# Patient Record
Sex: FEMALE | Race: WHITE | NOT HISPANIC OR LATINO | Employment: STUDENT | URBAN - METROPOLITAN AREA
[De-identification: names, ages, dates, MRNs, and addresses within clinical notes are randomized per-mention and may not be internally consistent; named-entity substitution may affect disease eponyms.]

---
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
## 2022-09-26 ENCOUNTER — OFFICE VISIT (OUTPATIENT)
Dept: URGENT CARE | Facility: CLINIC | Age: 22
End: 2022-09-26
Payer: COMMERCIAL

## 2022-09-26 VITALS
DIASTOLIC BLOOD PRESSURE: 58 MMHG | WEIGHT: 135 LBS | RESPIRATION RATE: 16 BRPM | HEART RATE: 61 BPM | HEIGHT: 70 IN | OXYGEN SATURATION: 99 % | BODY MASS INDEX: 19.33 KG/M2 | TEMPERATURE: 98 F | SYSTOLIC BLOOD PRESSURE: 98 MMHG

## 2022-09-26 DIAGNOSIS — R09.82 PND (POST-NASAL DRIP): ICD-10-CM

## 2022-09-26 DIAGNOSIS — R05.9 COUGH: ICD-10-CM

## 2022-09-26 DIAGNOSIS — J06.9 UPPER RESPIRATORY TRACT INFECTION, UNSPECIFIED TYPE: Primary | ICD-10-CM

## 2022-09-26 PROCEDURE — 99213 PR OFFICE/OUTPT VISIT, EST, LEVL III, 20-29 MIN: ICD-10-PCS | Mod: S$GLB,,, | Performed by: PHYSICIAN ASSISTANT

## 2022-09-26 PROCEDURE — 3074F SYST BP LT 130 MM HG: CPT | Mod: CPTII,S$GLB,, | Performed by: PHYSICIAN ASSISTANT

## 2022-09-26 PROCEDURE — 3074F PR MOST RECENT SYSTOLIC BLOOD PRESSURE < 130 MM HG: ICD-10-PCS | Mod: CPTII,S$GLB,, | Performed by: PHYSICIAN ASSISTANT

## 2022-09-26 PROCEDURE — 3078F PR MOST RECENT DIASTOLIC BLOOD PRESSURE < 80 MM HG: ICD-10-PCS | Mod: CPTII,S$GLB,, | Performed by: PHYSICIAN ASSISTANT

## 2022-09-26 PROCEDURE — 1159F PR MEDICATION LIST DOCUMENTED IN MEDICAL RECORD: ICD-10-PCS | Mod: CPTII,S$GLB,, | Performed by: PHYSICIAN ASSISTANT

## 2022-09-26 PROCEDURE — 3008F PR BODY MASS INDEX (BMI) DOCUMENTED: ICD-10-PCS | Mod: CPTII,S$GLB,, | Performed by: PHYSICIAN ASSISTANT

## 2022-09-26 PROCEDURE — 1160F PR REVIEW ALL MEDS BY PRESCRIBER/CLIN PHARMACIST DOCUMENTED: ICD-10-PCS | Mod: CPTII,S$GLB,, | Performed by: PHYSICIAN ASSISTANT

## 2022-09-26 PROCEDURE — 1160F RVW MEDS BY RX/DR IN RCRD: CPT | Mod: CPTII,S$GLB,, | Performed by: PHYSICIAN ASSISTANT

## 2022-09-26 PROCEDURE — 99213 OFFICE O/P EST LOW 20 MIN: CPT | Mod: S$GLB,,, | Performed by: PHYSICIAN ASSISTANT

## 2022-09-26 PROCEDURE — 1159F MED LIST DOCD IN RCRD: CPT | Mod: CPTII,S$GLB,, | Performed by: PHYSICIAN ASSISTANT

## 2022-09-26 PROCEDURE — 3078F DIAST BP <80 MM HG: CPT | Mod: CPTII,S$GLB,, | Performed by: PHYSICIAN ASSISTANT

## 2022-09-26 PROCEDURE — 3008F BODY MASS INDEX DOCD: CPT | Mod: CPTII,S$GLB,, | Performed by: PHYSICIAN ASSISTANT

## 2022-09-26 RX ORDER — BENZONATATE 100 MG/1
200 CAPSULE ORAL 3 TIMES DAILY PRN
Qty: 30 CAPSULE | Refills: 0 | Status: SHIPPED | OUTPATIENT
Start: 2022-09-26 | End: 2023-09-03

## 2022-09-26 RX ORDER — PREDNISONE 20 MG/1
TABLET ORAL
Qty: 7 TABLET | Refills: 0 | Status: SHIPPED | OUTPATIENT
Start: 2022-09-26 | End: 2023-09-03

## 2022-09-26 NOTE — LETTER
September 26, 2022      [facility]  [address] [place] [address] JERALD GALAN E  JIGNESH CHANDLER [address]  Phone: [phone]  Fax: [phone]       Patient: Dara Garcia   YOB: 2000  Date of Visit: 09/26/2022    To Whom It May Concern:    Laith Garcia  was at [facility] on 09/26/2022. The patient may return to work/school on 9/27/22 with no restrictions. If you have any questions or concerns, or if I can be of further assistance, please do not hesitate to contact me.    Sincerely,    Aretha Farias PA-C

## 2022-09-26 NOTE — PROGRESS NOTES
"Subjective:       Patient ID: Dara Garcia is a 22 y.o. female.    Vitals:  height is 5' 10" (1.778 m) and weight is 61.2 kg (135 lb). Her oral temperature is 97.8 °F (36.6 °C). Her blood pressure is 98/58 (abnormal) and her pulse is 61. Her respiration is 16 and oxygen saturation is 99%.     Chief Complaint: Cough    Pt c/o sx of sore throat, postnasal drip for 2 weeks, and a cough that has been going on for about 1 week.   She has been taking Dayquil.    Cough  This is a new problem. The current episode started 1 to 4 weeks ago. The problem has been unchanged. The cough is Non-productive. Associated symptoms include nasal congestion and postnasal drip. Pertinent negatives include no chills, ear pain, fever, headaches, myalgias, rhinorrhea, sore throat, shortness of breath or wheezing. She has tried OTC cough suppressant for the symptoms. The treatment provided no relief.     Constitution: Negative for chills and fever.   HENT:  Positive for postnasal drip. Negative for ear pain and sore throat.    Cardiovascular: Negative.    Respiratory:  Positive for cough. Negative for shortness of breath and wheezing.    Gastrointestinal: Negative.    Musculoskeletal:  Negative for muscle ache.   Skin: Negative.    Neurological:  Negative for headaches.     Objective:      Physical Exam   Constitutional: She appears well-developed.  Non-toxic appearance. She does not appear ill. No distress.   HENT:   Head: Normocephalic and atraumatic.   Ears:   Right Ear: Tympanic membrane, external ear and ear canal normal.   Left Ear: Tympanic membrane, external ear and ear canal normal.   Nose: Congestion present.   Mouth/Throat: Mucous membranes are moist. Posterior oropharyngeal erythema (+ PND) present. No oropharyngeal exudate.   Eyes: Conjunctivae and EOM are normal.   Neck: Neck supple.   Pulmonary/Chest: Effort normal and breath sounds normal.   Abdominal: Normal appearance.   Musculoskeletal: Normal range of motion.   "       General: Normal range of motion.   Lymphadenopathy:     She has no cervical adenopathy.   Neurological: no focal deficit. She is alert. She displays no weakness. Gait normal.   Skin: Skin is warm, dry, not diaphoretic, not pale and no rash.   Psychiatric: Her behavior is normal.       Assessment:       1. Upper respiratory tract infection, unspecified type    2. PND (post-nasal drip)    3. Cough          Plan:       Symptoms ongoing for about 2 weeks, afebrile. Low concern for covid or flu at this time and pt does not wish to be tested.  Continue DayQuil as needed.  Tessalon Perles as needed for cough.  Will start on prednisone taper and see if any improvement.  Also discussed beginning daily antihistamine and/or Flonase.  Recommend close follow-up with PCP if symptoms worsen or fail to improve.  Upper respiratory tract infection, unspecified type  -     predniSONE (DELTASONE) 20 MG tablet; Take 1 tablet twice daily for 2 days. Then take 1 tablet daily for 3 days.  Dispense: 7 tablet; Refill: 0  -     benzonatate (TESSALON PERLES) 100 MG capsule; Take 2 capsules (200 mg total) by mouth 3 (three) times daily as needed for Cough.  Dispense: 30 capsule; Refill: 0    PND (post-nasal drip)  -     predniSONE (DELTASONE) 20 MG tablet; Take 1 tablet twice daily for 2 days. Then take 1 tablet daily for 3 days.  Dispense: 7 tablet; Refill: 0  -     benzonatate (TESSALON PERLES) 100 MG capsule; Take 2 capsules (200 mg total) by mouth 3 (three) times daily as needed for Cough.  Dispense: 30 capsule; Refill: 0    Cough  -     predniSONE (DELTASONE) 20 MG tablet; Take 1 tablet twice daily for 2 days. Then take 1 tablet daily for 3 days.  Dispense: 7 tablet; Refill: 0  -     benzonatate (TESSALON PERLES) 100 MG capsule; Take 2 capsules (200 mg total) by mouth 3 (three) times daily as needed for Cough.  Dispense: 30 capsule; Refill: 0

## 2023-01-05 ENCOUNTER — OFFICE VISIT (OUTPATIENT)
Dept: PEDIATRICS | Facility: CLINIC | Age: 23
End: 2023-01-05
Payer: COMMERCIAL

## 2023-01-05 VITALS — BODY MASS INDEX: 18.99 KG/M2 | WEIGHT: 132.38 LBS | TEMPERATURE: 98 F

## 2023-01-05 DIAGNOSIS — J32.9 SINUSITIS, UNSPECIFIED CHRONICITY, UNSPECIFIED LOCATION: ICD-10-CM

## 2023-01-05 DIAGNOSIS — J06.9 UPPER RESPIRATORY TRACT INFECTION, UNSPECIFIED TYPE: ICD-10-CM

## 2023-01-05 DIAGNOSIS — J02.9 PHARYNGITIS, UNSPECIFIED ETIOLOGY: Primary | ICD-10-CM

## 2023-01-05 DIAGNOSIS — U07.1 COVID: ICD-10-CM

## 2023-01-05 LAB
CTP QC/QA: YES
FLUAV AG NPH QL: NEGATIVE
FLUBV AG NPH QL: NEGATIVE
S PYO RRNA THROAT QL PROBE: NEGATIVE
SARS-COV-2 RDRP RESP QL NAA+PROBE: POSITIVE

## 2023-01-05 PROCEDURE — 87880 STREP A ASSAY W/OPTIC: CPT | Mod: QW,S$GLB,, | Performed by: PEDIATRICS

## 2023-01-05 PROCEDURE — 99999 PR PBB SHADOW E&M-EST. PATIENT-LVL III: ICD-10-PCS | Mod: PBBFAC,,, | Performed by: PEDIATRICS

## 2023-01-05 PROCEDURE — 87635: ICD-10-PCS | Mod: QW,S$GLB,, | Performed by: PEDIATRICS

## 2023-01-05 PROCEDURE — 1160F RVW MEDS BY RX/DR IN RCRD: CPT | Mod: CPTII,S$GLB,, | Performed by: PEDIATRICS

## 2023-01-05 PROCEDURE — 3008F BODY MASS INDEX DOCD: CPT | Mod: CPTII,S$GLB,, | Performed by: PEDIATRICS

## 2023-01-05 PROCEDURE — 87635 SARS-COV-2 COVID-19 AMP PRB: CPT | Mod: QW,S$GLB,, | Performed by: PEDIATRICS

## 2023-01-05 PROCEDURE — 1159F MED LIST DOCD IN RCRD: CPT | Mod: CPTII,S$GLB,, | Performed by: PEDIATRICS

## 2023-01-05 PROCEDURE — 99999 PR PBB SHADOW E&M-EST. PATIENT-LVL III: CPT | Mod: PBBFAC,,, | Performed by: PEDIATRICS

## 2023-01-05 PROCEDURE — 1160F PR REVIEW ALL MEDS BY PRESCRIBER/CLIN PHARMACIST DOCUMENTED: ICD-10-PCS | Mod: CPTII,S$GLB,, | Performed by: PEDIATRICS

## 2023-01-05 PROCEDURE — 87880 POCT RAPID STREP A: ICD-10-PCS | Mod: QW,S$GLB,, | Performed by: PEDIATRICS

## 2023-01-05 PROCEDURE — 87804 POCT INFLUENZA A/B: ICD-10-PCS | Mod: QW,S$GLB,, | Performed by: PEDIATRICS

## 2023-01-05 PROCEDURE — 87804 INFLUENZA ASSAY W/OPTIC: CPT | Mod: QW,S$GLB,, | Performed by: PEDIATRICS

## 2023-01-05 PROCEDURE — 99213 OFFICE O/P EST LOW 20 MIN: CPT | Mod: 25,S$GLB,, | Performed by: PEDIATRICS

## 2023-01-05 PROCEDURE — 1159F PR MEDICATION LIST DOCUMENTED IN MEDICAL RECORD: ICD-10-PCS | Mod: CPTII,S$GLB,, | Performed by: PEDIATRICS

## 2023-01-05 PROCEDURE — 3008F PR BODY MASS INDEX (BMI) DOCUMENTED: ICD-10-PCS | Mod: CPTII,S$GLB,, | Performed by: PEDIATRICS

## 2023-01-05 PROCEDURE — 99213 PR OFFICE/OUTPT VISIT, EST, LEVL III, 20-29 MIN: ICD-10-PCS | Mod: 25,S$GLB,, | Performed by: PEDIATRICS

## 2023-01-05 RX ORDER — ACETAMINOPHEN 500 MG
500 TABLET ORAL EVERY 6 HOURS PRN
COMMUNITY

## 2023-01-05 RX ORDER — AMOXICILLIN 875 MG/1
875 TABLET, FILM COATED ORAL 2 TIMES DAILY
Qty: 20 TABLET | Refills: 0 | Status: SHIPPED | OUTPATIENT
Start: 2023-01-05 | End: 2023-01-05 | Stop reason: ALTCHOICE

## 2023-01-05 RX ORDER — DEXTROMETHORPHAN HYDROBROMIDE, GUAIFENESIN AND PHENYLEPHRINE HYDROCHLORIDE 15; 400; 10 MG/1; MG/1; MG/1
1 TABLET ORAL
Qty: 30 TABLET | Refills: 0 | Status: SHIPPED | OUTPATIENT
Start: 2023-01-05

## 2023-01-05 NOTE — PATIENT INSTRUCTIONS
Dr. Moody, [facility]  [phone]      COVID 19    What is COVID 19?:  - Viral infection of the nose, throat, trachea (windpipe) and bronchi caused by a Corona Virus   - Main symptoms can be:   -Fever (above 100.4 deg)   -Cough   -Shortness of breath   -Fatigue   -Muscle aches   -New loss of taste or smell   -Sore throat   -Headache   -Congestion or runny nose   -Vomiting/diarrhea sometimes    Cause  -  Infection with Corona Virus (SARS-CoV-2)- new to world in late 2019, early 2020  -  Detected by nasopharyngeal swab for rapid antigen test (about 98% sensitive)  -  PCR via deep nasal swab is a test sent to an outside laboratory, if needed    How long does it last?  - Fever, Muscle aches, Fatigue for 3 - 5 days, up to 7 days  - Runny or stuffy nose for 1 - 2 weeks  - Cough for 2 - 3 weeks (slowly resolving)    Treatment:  1. For fever or aches:  - Acetaminophen (Tylenol) given every 4 hours   - Ibuprofen (Motrin, Advil) given every 6 hours (if > 6 months old)  - may alternate acetaminophen and ibuprofen every 3 hours  2.  Hydration and rest  3.  Cough/cold medicines for symptomatic relief  4.  Antibiotics do not treat COVID    Isolation:  - Incubation period (from exposure to symptoms)  4 - 5 days, may extend up to 12-14 days;   - Omicron seems to have shorter incubation period  - Isolate for 5 days (last 24 hours symptom free without Tylenol or Motrin),'  - Following this isolation, wear a mask for 5 days when around others  ** new recommendation 12/27/21**  - Obtain a COVID VACCINE to prevent    Quarantine period for those exposed to COVID 19:  - Unvaccinated- 5 days of quarantine, followed by 5 days of masking  - Vaccinated individuals DO NOT NEED to quarantine, but should wear a mask for 10 days  -**If symptoms occur, immediately quarantine until a negative test confirms no COVID    Contagiousness/Prevention:  - Wash hands with soap or other disinfectant    - Cough into armpit or tissue  - Avoid touching eyes, nose or mouth    Complications:  - Pneumonia  - Respiratory failure  - Otitis media (ear infections)  - Blood clots/strokes  - Hypersensitivity reactions of your body to the COVID illness   - Cytokine storm  - Over 900,000 have  from COVID-19 in the [place] and 5.75 million deaths in the world    Facts about COVID:  - Virus may remain on objects, i. e. books, door knobs for up to 8 hours  - Sneezing may transmit germs as far as 10 - 15 feet  - Coughs may transmit germs 3 - 6 feet    Definition of exposure:  - Being within 6 feet of someone with COVID for at least 15 minutes   - within 48 hours before developing the symptoms of COVID or having COVID symptoms    Multisystem Inflammatory Syndrome in Children (MIS-C):  - different parts of the body become inflamed- lungs, heart, kidney, brain, skin, eyes and GI organs  - cause unknown, but seems to follow COVID in many children afflicted  - Can be serious, even deadly, but most children with MIS-C have gotten better with medical care.    Differences between COVID 19 and Flu:  - COVID spreads more easily and causes more serious illness.    Facts about COVID Vaccine:  - Safe and effective for children > 5 years old, adolescents and adults  - Decreases severe COVID complications/hospitalizations and your chance of becoming infected with COVID at all    Call our office if:  - Your child is getting worse  - Breathing problems  - Bluish or gray skin color  - Not drinking enough fluids  - Severe or persistent vomiting  - Significant irritability, confusion, dizziness or not interacting (out of it)  - Secondary fever or return of symptoms after they initially resolve  - You have any concerns or questions      **AS WE GAIN MORE KNOWLEDGE OF COVID, RECOMMENDATIONS MAY CHANGE**                       Vikram Esquivel Perilloux, [place]  Pediatric and Adolescent Medicine  [phone]        SINUSITIS or Sinus  Infection      What is sinusitis?:  Infection of the nasal cavities within your childs face or skull by viruses or bacteria,   Childrens sinuses are not fully developed until the teen years.    Cause:  Following a cold (URI) inflammation of the nasal passages block the opening of the sinuses resulting in a decreased flow of secretions, thus bacteria grow in the area.  Streptococcus, Haemophilus influenza and Moraxella are common causes of bacterial infection    What symptoms are present?  URI (cold) lasting longer than 10-14 days or worsening after 7 days  Fever- especially if persistent  Thick yellow-green nasal drainage for more than a few days  Bad breath  Nighttime cough  Nausea/vomiting  Headache (if >5 years old)  Irritability  Pain or pressure around the eyes    How is it diagnosed?  CLINICALLY  Rarely need X-rays or CT scan or cultures of sinuses    TREATMENT:  For relief of discomfort and/or fever:  Acetaminophen (Tylenol) q 4 hrs.  Ibuprofen (Motrin, Advil)  q 6 hrs. (if > 6 months old)   *may alternate every 3 hours    Antibiotics are indicated if the infection seems to be bacterial  Amoxil, Omnicef, Zithromax, Omnicef and others may be used.  Make sure you finish the antibiotic course even if your child feels better after a few days    For relief of congestion/cough:  Cool mist humidifier   OTC decongestant/antihistamines  Polytussin- DM liquid  Aquanaz or other tablets    Rest and Hydration    Contagiousness:  - Sinus infections are NOT contagious, but the URI that led to the sinusitis may be contagious  - Return to /school after fever resolves and pain is manageable    Call Immediately if:  - Your childs neck becomes stiff  - Your child starts acting very sick    Call during regular office hours if:  Fever lasts more than 3 days  - Your child is getting worse  - You have any concerns or questions

## 2023-01-05 NOTE — PROGRESS NOTES
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    HPI  Patient is here today with concerns about  sore throat, runny nose, coughing x 1 week. Pt denies diarrhea and vomiting. Pt has a loss of appetite. Pt has been fatigued and sluggish. Pt has been taking tylenol,Dayquil with minimal relief.     Seemed better, then returned rhinitis; cough.    Dara's allergies, medications, history, and problem list were updated as appropriate.    Review of Systems  A comprehensive review of symptoms was completed and negative except as noted in the HPI.    OBJECTIVE:  Vital signs  Vitals:    01/05/23 1428   Temp: 97.6 °F (36.4 °C)   TempSrc: Oral   Weight: 60 kg (132 lb 6 oz)        Physical Exam  Vitals reviewed.   Constitutional:       Appearance: Normal appearance.   HENT:      Right Ear: Tympanic membrane normal.      Left Ear: Tympanic membrane normal.      Nose: Nose normal.      Mouth/Throat:      Pharynx: Posterior oropharyngeal erythema present.   Eyes:      Conjunctiva/sclera: Conjunctivae normal.   Cardiovascular:      Rate and Rhythm: Normal rate and regular rhythm.      Heart sounds: Normal heart sounds. No murmur heard.    No friction rub. No gallop.   Pulmonary:      Breath sounds: Normal breath sounds.   Abdominal:      Palpations: Abdomen is soft.      Tenderness: There is no abdominal tenderness.   Musculoskeletal:         General: Normal range of motion.      Cervical back: Neck supple.   Skin:     Findings: No rash.   Neurological:      General: No focal deficit present.         ASSESSMENT/PLAN:  Dara was seen today for uri, nasal congestion and cough.    Diagnoses and all orders for this visit:    Pharyngitis, unspecified etiology  -     POCT Rapid Strep A  -     POCT Influenza A/B  -     POCT COVID-19 Rapid Screening    COVID    Sinusitis, unspecified chronicity, unspecified location  -     Discontinue: amoxicillin (AMOXIL) 875 MG tablet; Take 1 tablet (875  mg total) by mouth 2 (two) times daily. for 10 days    Upper respiratory tract infection, unspecified type  -     POCT Influenza A/B  -     POCT COVID-19 Rapid Screening  -     phenylephrine-DM-guaiFENesin (AQUANAZ) 10- mg Tab; Take 1 tablet by mouth every 4 to 6 hours as needed (for congestion/cough).      HO- COVID    Handout provided  Follow instructions listed on hand out for treatment  Call or return to clinic if worsens or does not resolve      Office Visit on 01/05/2023   Component Date Value Ref Range Status    Rapid Strep A Screen 01/05/2023 Negative  Negative Final     Acceptable 01/05/2023 Yes   Final    Rapid Influenza A Ag 01/05/2023 Negative  Negative Final    Rapid Influenza B Ag 01/05/2023 Negative  Negative Final     Acceptable 01/05/2023 Yes   Final    POC Rapid COVID 01/05/2023 Positive (A)  Negative Final     Acceptable 01/05/2023 Yes   Final       Follow Up:  No follow-ups on file.

## 2023-02-02 ENCOUNTER — OFFICE VISIT (OUTPATIENT)
Dept: PEDIATRICS | Facility: CLINIC | Age: 23
End: 2023-02-02
Payer: COMMERCIAL

## 2023-02-02 VITALS — WEIGHT: 131.63 LBS | TEMPERATURE: 98 F | BODY MASS INDEX: 18.88 KG/M2

## 2023-02-02 DIAGNOSIS — J32.9 SINUSITIS, UNSPECIFIED CHRONICITY, UNSPECIFIED LOCATION: Primary | ICD-10-CM

## 2023-02-02 PROCEDURE — 99213 PR OFFICE/OUTPT VISIT, EST, LEVL III, 20-29 MIN: ICD-10-PCS | Mod: S$GLB,,, | Performed by: PEDIATRICS

## 2023-02-02 PROCEDURE — 99213 OFFICE O/P EST LOW 20 MIN: CPT | Mod: S$GLB,,, | Performed by: PEDIATRICS

## 2023-02-02 PROCEDURE — 3008F BODY MASS INDEX DOCD: CPT | Mod: CPTII,S$GLB,, | Performed by: PEDIATRICS

## 2023-02-02 PROCEDURE — 99999 PR PBB SHADOW E&M-EST. PATIENT-LVL III: CPT | Mod: PBBFAC,,, | Performed by: PEDIATRICS

## 2023-02-02 PROCEDURE — 1159F PR MEDICATION LIST DOCUMENTED IN MEDICAL RECORD: ICD-10-PCS | Mod: CPTII,S$GLB,, | Performed by: PEDIATRICS

## 2023-02-02 PROCEDURE — 3008F PR BODY MASS INDEX (BMI) DOCUMENTED: ICD-10-PCS | Mod: CPTII,S$GLB,, | Performed by: PEDIATRICS

## 2023-02-02 PROCEDURE — 99999 PR PBB SHADOW E&M-EST. PATIENT-LVL III: ICD-10-PCS | Mod: PBBFAC,,, | Performed by: PEDIATRICS

## 2023-02-02 PROCEDURE — 1159F MED LIST DOCD IN RCRD: CPT | Mod: CPTII,S$GLB,, | Performed by: PEDIATRICS

## 2023-02-02 RX ORDER — FLUTICASONE PROPIONATE 50 MCG
1 SPRAY, SUSPENSION (ML) NASAL DAILY
COMMUNITY

## 2023-02-02 RX ORDER — AMOXICILLIN AND CLAVULANATE POTASSIUM 875; 125 MG/1; MG/1
1 TABLET, FILM COATED ORAL 2 TIMES DAILY
Qty: 20 TABLET | Refills: 0 | Status: SHIPPED | OUTPATIENT
Start: 2023-02-02

## 2023-02-02 RX ORDER — GUAIFENESIN AND PHENYLEPHRINE HCL 385; 10 MG/1; MG/1
1 TABLET ORAL
Qty: 20 TABLET | Refills: 0 | Status: SHIPPED | OUTPATIENT
Start: 2023-02-02

## 2023-02-02 NOTE — PROGRESS NOTES
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    HPI  C/O: sore throat for 3-4 days that has subsided, nasal congestion for about a week, right sinus pain/ tooth pain, and no fever. Dayquil, Nyquil, and Flonase in the last 24 hours.  Nasal speech, no sleep, facial pain right maxillary, no smell.   COVID in January 2023.    Dara's allergies, medications, history, and problem list were updated as appropriate.  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    Review of Systems  A comprehensive review of symptoms was completed and negative except as noted in the HPI.    OBJECTIVE:  Vital signs  Vitals:    02/02/23 1015   Temp: 98 °F (36.7 °C)   TempSrc: Oral   Weight: 59.7 kg (131 lb 9.6 oz)        Physical Exam  Constitutional:       General: She is not in acute distress.     Appearance: Normal appearance. She is normal weight. She is not ill-appearing or toxic-appearing.   HENT:      Head: Normocephalic.      Right Ear: Tympanic membrane, ear canal and external ear normal.      Left Ear: Tympanic membrane, ear canal and external ear normal.      Nose: Nose normal.      Mouth/Throat:      Mouth: Mucous membranes are moist.      Pharynx: Oropharynx is clear.   Eyes:      Extraocular Movements: Extraocular movements intact.      Conjunctiva/sclera: Conjunctivae normal.      Pupils: Pupils are equal, round, and reactive to light.   Cardiovascular:      Rate and Rhythm: Normal rate and regular rhythm.      Pulses: Normal pulses.      Heart sounds: Normal heart sounds. No murmur heard.  Pulmonary:      Effort: Pulmonary effort is normal.      Breath sounds: Normal breath sounds.   Abdominal:      General: Abdomen is flat. Bowel sounds are normal.      Palpations: Abdomen is soft.   Musculoskeletal:         General: Normal range of motion.      Cervical back: Normal range of motion and neck supple. No tenderness.    Lymphadenopathy:      Cervical: No cervical adenopathy.   Skin:     General: Skin is warm.   Neurological:      General: No focal deficit present.      Mental Status: She is alert and oriented to person, place, and time.      Gait: Tandem walk normal.   Psychiatric:         Mood and Affect: Mood normal.         Behavior: Behavior normal.         Thought Content: Thought content normal.         ASSESSMENT/PLAN:  Dara was seen today for sinusitis and nasal congestion.    Diagnoses and all orders for this visit:    Sinusitis, unspecified chronicity, unspecified location    Other orders  -     amoxicillin-clavulanate 875-125mg (AUGMENTIN) 875-125 mg per tablet; Take 1 tablet by mouth 2 (two) times daily.  -     phenylephrine-guaiFENesin (DECONEX IR)  mg Tab; Take 1 tablet by mouth every 6 to 8 hours as needed (congestion).     Afrin 12hr - 3 sprays in RIGHT nostril at night for 3 nights    No visits with results within 1 Day(s) from this visit.   Latest known visit with results is:   Office Visit on 01/05/2023   Component Date Value Ref Range Status    Rapid Strep A Screen 01/05/2023 Negative  Negative Final     Acceptable 01/05/2023 Yes   Final    Rapid Influenza A Ag 01/05/2023 Negative  Negative Final    Rapid Influenza B Ag 01/05/2023 Negative  Negative Final     Acceptable 01/05/2023 Yes   Final    POC Rapid COVID 01/05/2023 Positive (A)  Negative Final     Acceptable 01/05/2023 Yes   Final       Follow Up:  Follow up in about 2 weeks (around 2/16/2023) for well visit needed.

## 2023-02-08 ENCOUNTER — TELEPHONE (OUTPATIENT)
Dept: PEDIATRICS | Facility: CLINIC | Age: 23
End: 2023-02-08
Payer: COMMERCIAL

## 2023-02-08 NOTE — TELEPHONE ENCOUNTER
Mom concerned amoxil causing chest tightness and abd pain. Has been on for 6 days. Recd prob abd pain and she can do probiotic but chest tightness is not heard of a lot. She does feel better per mom just concerned about amoxil causing it. Recd she should finish the course but she can stop it if worried and if symptoms persist, we need to listen.

## 2023-02-08 NOTE — TELEPHONE ENCOUNTER
----- Message from Apolinar Smiley MA sent at 2/8/2023  9:19 AM CST -----  Regarding: Medication/Symptom Concerns  Contact: [phone]  Saw Dr. MCCULLOUGH on 2/2 and dx with Sinusitis and put her on Amoxil. Is now having abdominal pain and chest tightness. Wondering if they should stop the Amoxil and if they should be concerned with symptoms?

## 2023-09-03 ENCOUNTER — OFFICE VISIT (OUTPATIENT)
Dept: URGENT CARE | Facility: CLINIC | Age: 23
End: 2023-09-03
Payer: COMMERCIAL

## 2023-09-03 VITALS
OXYGEN SATURATION: 100 % | SYSTOLIC BLOOD PRESSURE: 97 MMHG | BODY MASS INDEX: 18.75 KG/M2 | TEMPERATURE: 98 F | HEIGHT: 70 IN | HEART RATE: 79 BPM | DIASTOLIC BLOOD PRESSURE: 56 MMHG | WEIGHT: 131 LBS | RESPIRATION RATE: 16 BRPM

## 2023-09-03 DIAGNOSIS — R05.9 COUGH, UNSPECIFIED TYPE: ICD-10-CM

## 2023-09-03 DIAGNOSIS — J40 BRONCHITIS: Primary | ICD-10-CM

## 2023-09-03 PROCEDURE — 99213 PR OFFICE/OUTPT VISIT, EST, LEVL III, 20-29 MIN: ICD-10-PCS | Mod: S$GLB,,,

## 2023-09-03 PROCEDURE — 99213 OFFICE O/P EST LOW 20 MIN: CPT | Mod: S$GLB,,,

## 2023-09-03 RX ORDER — PROMETHAZINE HYDROCHLORIDE AND DEXTROMETHORPHAN HYDROBROMIDE 6.25; 15 MG/5ML; MG/5ML
5 SYRUP ORAL EVERY 4 HOURS PRN
Qty: 118 ML | Refills: 0 | Status: SHIPPED | OUTPATIENT
Start: 2023-09-03

## 2023-09-03 RX ORDER — PREDNISONE 10 MG/1
10 TABLET ORAL DAILY
Qty: 3 TABLET | Refills: 0 | Status: SHIPPED | OUTPATIENT
Start: 2023-09-03 | End: 2023-09-06

## 2023-09-03 RX ORDER — BENZONATATE 200 MG/1
200 CAPSULE ORAL 3 TIMES DAILY PRN
Qty: 30 CAPSULE | Refills: 0 | Status: SHIPPED | OUTPATIENT
Start: 2023-09-03

## 2023-09-03 RX ORDER — TIMOLOL MALEATE 5 MG/ML
1 SOLUTION/ DROPS OPHTHALMIC
COMMUNITY
Start: 2023-08-07

## 2023-09-03 NOTE — PATIENT INSTRUCTIONS
"You received an oral steroid today. This can elevate your blood pressure, elevate your blood sugar, cause water weight gain, and cause anxiousness.    Bronchitis  If your condition worsens or fails to improve we recommend that you receive another evaluation at the ER immediately or contact your PCP to discuss your concerns or return here. You must understand that you've received an urgent care treatment only and that you may be released before all your medical problems are known or treated. You the patient will arrange for follow-up care as instructed.  Rest and fluids are important.  Can use honey with olga to soothe your throat.  Take inhaler as prescribed and needed for wheezing.  Take prescription cough meds (pills) as prescribed; take prescription cough syrup at night as needed for cough. Do not take both the prescribed cough pills and syrup at the same time.  -  Flonase (fluticasone) is a nasal spray which is available over the counter and may help with your symptoms.   -  Zyrtec D, Claritin D or Allegra D can also help with symptoms of congestion and drainage.   -  If you have hypertension avoid using the "D" which is the decongestant.  Instead you can use Coricidin HBP for cold and cough symptoms.    -  If you just have drainage you can take plain Zyrtec, Claritin or Allegra.  -  If you just have a congested feeling you can take pseudoephedrine (unless you have high blood pressure) which you have to sign for behind the counter. Do not buy the phenylephrine which is on the shelf as it is not effective.  -  Rest and fluids are also important.   -  Tylenol or ibuprofen can also be used as directed for pain unless you have an allergy to them or medical condition such as stomach ulcers, kidney or liver disease or blood thinners etc for which you should not be taking these type of medications.   Please follow-up with your primary care doctor or specialist in the next 48-72 hrs as needed and if no " improvement.  If you smoke, please stop smoking.

## 2023-09-03 NOTE — PROGRESS NOTES
"Subjective:      Patient ID: Dara Garcia is a 23 y.o. female.    Vitals:  height is 5' 10" (1.778 m) and weight is 59.4 kg (131 lb). Her temperature is 97.8 °F (36.6 °C). Her blood pressure is 97/56 (abnormal) and her pulse is 79. Her respiration is 16 and oxygen saturation is 100%.     Chief Complaint: Cough    Dara Garcia is a 23 y.o. female who presents for productive cough which onset 10 days ago. No associated sxs included. Patient denies any fever, chills, SOB, CP, abd pain, n/v/d, rash, dizziness, or numbness/tingling. Prior Tx includes Dayquil and Nyquil with no relief. Denies sick contact.    Cough  This is a new problem. Episode onset: appx 10 days ago. The problem has been unchanged. The problem occurs constantly. The cough is Productive of sputum. Associated symptoms include nasal congestion, postnasal drip and rhinorrhea. Pertinent negatives include no chest pain, chills, ear congestion, ear pain, fever, headaches, heartburn, hemoptysis, myalgias, rash, sore throat, shortness of breath, sweats, weight loss or wheezing. Treatments tried: dayquil, zyrtec. The treatment provided no relief.       Constitution: Negative for appetite change, chills, sweating, fatigue and fever.   HENT:  Positive for congestion and postnasal drip. Negative for ear pain, ear discharge, hearing loss, sinus pain, sinus pressure, sore throat and trouble swallowing.    Cardiovascular:  Negative for chest pain.   Respiratory:  Positive for cough and sputum production. Negative for bloody sputum, shortness of breath and wheezing.    Gastrointestinal:  Negative for abdominal pain, nausea, vomiting, diarrhea and heartburn.   Musculoskeletal:  Negative for muscle ache.   Skin:  Negative for rash.   Neurological:  Negative for dizziness, headaches, numbness and tingling.      Objective:     Physical Exam   Constitutional: She is oriented to person, place, and time. She appears well-developed. She is " cooperative.  Non-toxic appearance. She does not appear ill. No distress.   HENT:   Head: Normocephalic and atraumatic.   Ears:   Right Ear: Hearing, tympanic membrane, external ear and ear canal normal.   Left Ear: Hearing, tympanic membrane, external ear and ear canal normal.   Nose: Nose normal. No mucosal edema or nasal deformity. No epistaxis. Right sinus exhibits no maxillary sinus tenderness and no frontal sinus tenderness. Left sinus exhibits no maxillary sinus tenderness and no frontal sinus tenderness.   Mouth/Throat: Uvula is midline, oropharynx is clear and moist and mucous membranes are normal. Mucous membranes are moist. No trismus in the jaw. Normal dentition. No uvula swelling. No oropharyngeal exudate, posterior oropharyngeal edema or posterior oropharyngeal erythema.   Eyes: Conjunctivae and lids are normal. No scleral icterus. Extraocular movement intact   Neck: Trachea normal and phonation normal. Neck supple. No edema present. No erythema present. No neck rigidity present.   Cardiovascular: Normal rate, regular rhythm, normal heart sounds and normal pulses.   Pulmonary/Chest: Effort normal and breath sounds normal. No stridor. No respiratory distress. She has no decreased breath sounds. She has no wheezes. She has no rhonchi. She has no rales.   Abdominal: Normal appearance.   Musculoskeletal: Normal range of motion.         General: No deformity. Normal range of motion.   Neurological: She is alert and oriented to person, place, and time. She exhibits normal muscle tone. Coordination normal.   Skin: Skin is warm, dry, intact, not diaphoretic and not pale.   Psychiatric: Her speech is normal and behavior is normal. Judgment and thought content normal.   Nursing note and vitals reviewed.      Assessment:     1. Bronchitis    2. Cough, unspecified type        Plan:       Bronchitis  -     predniSONE (DELTASONE) 10 MG tablet; Take 1 tablet (10 mg total) by mouth once daily. for 3 days  Dispense: 3  tablet; Refill: 0    Cough, unspecified type  -     promethazine-dextromethorphan (PROMETHAZINE-DM) 6.25-15 mg/5 mL Syrp; Take 5 mLs by mouth every 4 (four) hours as needed (for sore throat).  Dispense: 118 mL; Refill: 0  -     benzonatate (TESSALON) 200 MG capsule; Take 1 capsule (200 mg total) by mouth 3 (three) times daily as needed for Cough.  Dispense: 30 capsule; Refill: 0    Afebrile. VSS. Patient is in NAD.  Discussed negative results with patient.  Educated patient on viral vs bacterial bronchitis.  Meds: tessalon perles, Promethazine DM, and prednisone sent to preferred pharmacy. Discussed side effects of steroid use. Patient verbalized understanding.  Advised patient to continue oral antihistamine for symptom relief.    Increase fluid intake and plenty of rest.  Tylenol/Ibuprofen (as permitted) as needed for any pain or discomfort.  If symptoms do not resolve, return to clinic for further evaluation.  ER precautions given such as SOB, CP, or fever not resolved with fever-reducing medications.

## 2024-02-08 ENCOUNTER — PATIENT OUTREACH (OUTPATIENT)
Dept: ADMINISTRATIVE | Facility: HOSPITAL | Age: 24
End: 2024-02-08
Payer: COMMERCIAL